# Patient Record
Sex: FEMALE | Race: WHITE
[De-identification: names, ages, dates, MRNs, and addresses within clinical notes are randomized per-mention and may not be internally consistent; named-entity substitution may affect disease eponyms.]

---

## 2018-03-09 ENCOUNTER — HOSPITAL ENCOUNTER (OUTPATIENT)
Dept: HOSPITAL 62 - RAD | Age: 83
End: 2018-03-09
Attending: NURSE PRACTITIONER
Payer: MEDICARE
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
## 2018-03-09 DIAGNOSIS — S32.019A: Primary | ICD-10-CM

## 2018-03-09 PROCEDURE — 72131 CT LUMBAR SPINE W/O DYE: CPT

## 2018-03-09 NOTE — RADIOLOGY REPORT (SQ)
EXAM DESCRIPTION:  CT LUMBAR SPINE WITHOUT



COMPLETED DATE/TIME:  3/9/2018 9:52 am



REASON FOR STUDY:  PAIN (R52) R52  PAIN, UNSPECIFIED



COMPARISON:  None.



TECHNIQUE:  Axial images acquired through the lumbar spine without intravenous contrast.  Images revi
ewed with lung, soft tissue and bone windows.  Reconstructed coronal and sagittal MPR images reviewed
.  All images stored on PACS.

All CT scanners at this facility use dose modulation, iterative reconstruction, and/or weight based d
osing when appropriate to reduce radiation dose to as low as reasonably achievable (ALARA).

[facility]: Dose Right  [facility]: CareDose    [facility]: Dose Right    [facility]: Teradose 4D    [facility]: Smart Technologies



RADIATION DOSE:  CT Rad equipment meets quality standard of care and radiation dose reduction techniq
ues were employed. CTDIvol: 16.7 mGy. DLP: 502 mGy-cm. mGy.



LIMITATIONS:  None.



FINDINGS:  SEGMENTATION: Normal.  No transitional anatomy.

ALIGNMENT: Normal.

VERTEBRAL BODIES: There is a compression fracture involving the superior endplate of L1 which appears
 to be acute.  There is approximately 50% loss of height.  There is posterior displacement of the sup
erior corner of the backwall, approximately 3.4 mm.  AP diameter of the spinal canal at this level is
 15 cm.  The remainder of the lumbar vertebrae are intact.

DISCS: No significant protrusions.  Study limited by lack of intrathecal contrast.

PEDICLES, TRANSVERSE PROCESSES: Facet arthropathy throughout.  No fractures.  No dislocation.  No acu
te findings.

FACETS, POSTERIOR ELEMENTS: No fractures.  No dislocation.  No spinal stenosis.

HARDWARE: None in the spine.

VISUALIZED RIBS: No fractures.

SOFT TISSUES: No significant or acute finding in adjacent soft tissues.

OTHER: No other significant finding.



IMPRESSION:

1. RELATIVELY ACUTE COMPRESSION FRACTURE INVOLVING THE SUPERIOR ENDPLATE OF THE L1 VERTEBRAL BODY WIT
H APPROXIMATELY 50% LOSS OF HEIGHT.  MILD POSTERIOR DISPLACEMENT OF THE SUPERIOR CORNER OF THE BACKWA
LL.  THE REMAINDER OF THE LUMBAR VERTEBRAE ARE INTACT.

2. CHRONIC DEGENERATIVE CHANGES.  PROMINENT FACET ARTHROPATHY.



TECHNICAL DOCUMENTATION:  JOB ID:  [id number]

Quality ID # 436: Final reports with documentation of one or more dose reduction techniques (e.g., Au
tomated exposure control, adjustment of the mA and/or kV according to patient size, use of iterative 
reconstruction technique)

 2011 [employer]- All Rights Reserved



Reading location - IP/workstation name: [email or web address]

## 2018-03-10 ENCOUNTER — HOSPITAL ENCOUNTER (EMERGENCY)
Dept: HOSPITAL 62 - ER | Age: 83
Discharge: LEFT BEFORE DISCHARGE | End: 2018-03-10
Payer: MEDICARE

## 2018-03-10 DIAGNOSIS — Z53.21: Primary | ICD-10-CM

## 2018-03-21 ENCOUNTER — HOSPITAL ENCOUNTER (OUTPATIENT)
Dept: HOSPITAL 62 - RAD | Age: 83
End: 2018-03-21
Attending: ORTHOPAEDIC SURGERY
Payer: MEDICARE

## 2018-03-21 DIAGNOSIS — M54.5: Primary | ICD-10-CM

## 2018-03-21 PROCEDURE — 72148 MRI LUMBAR SPINE W/O DYE: CPT

## 2018-03-21 NOTE — RADIOLOGY REPORT (SQ)
EXAM DESCRIPTION:  MRI LUMBAR SPINE WITHOUT



COMPLETED DATE/TIME:  3/21/2018 10:52 am



REASON FOR STUDY:  LUMBAGO M54.5  LOW BACK PAIN



COMPARISON:  None.



TECHNIQUE:  Sagittal and Axial imaging includes T1, T2, STIR and gradient echo sequences. Coronal T2/
HASTE imaging.



LIMITATIONS:  Patient motion.



FINDINGS:  VISUALIZED UPPER ABDOMEN: Large right renal cyst.

SEGMENTATION: No transitional anatomy. The lowest well-developed disc space is labeled L5-S1.

ALIGNMENT: Anatomic.

VERTEBRAE: Compression fracture L1 approximately 75% height loss.  Mild retropulsion.

BONE MARROW: Heterogeneous increased T2 and decreased T1 signal in L1 vertebral body.

DISC SIGNAL: Desiccation several levels.

POSTERIOR ELEMENTS:  Intact.

HARDWARE: None in the spine.

CORD AND CONUS: Normal in size and signal intensity. Conus at the appropriate level.

SOFT TISSUES: No aortic aneurysm seen. No bulky retroperitoneal adenopathy or mass. No paraspinal mas
s or fluid.

L1-L2: Mild narrowing of the spinal canal and lateral recesses due to disc bulge and facet arthropath
y.

L2-L3: Mild narrowing of the spinal canal and lateral recesses due to disc bulge and facet arthropath
y.

L3-L4: Mild narrowing of the spinal canal and lateral recesses due to disc bulge and facet arthropath
y.

L4-L5: Mild narrowing spinal canal due to disc bulge and facet arthropathy.

L5-S1: Tiny central annular fissure.  Facet arthropathy.  No significant stenosis.

LOWER THORACIC: Mild spinal stenosis T12-L1.

SACRUM: Visualized upper sacrum intact.

OTHER: No other significant findings.



IMPRESSION:  Recent compression fracture L1.



TECHNICAL DOCUMENTATION:  JOB ID:  [id number]

 2011 [employer]- All Rights Reserved



Reading location - IP/workstation name: [email or web address]

## 2018-06-01 ENCOUNTER — HOSPITAL ENCOUNTER (EMERGENCY)
Dept: HOSPITAL 62 - ER | Age: 83
Discharge: HOME | End: 2018-06-01
Payer: MEDICARE

## 2018-06-01 VITALS — DIASTOLIC BLOOD PRESSURE: 61 MMHG | SYSTOLIC BLOOD PRESSURE: 124 MMHG

## 2018-06-01 DIAGNOSIS — G30.9: ICD-10-CM

## 2018-06-01 DIAGNOSIS — F02.80: ICD-10-CM

## 2018-06-01 DIAGNOSIS — N30.00: Primary | ICD-10-CM

## 2018-06-01 DIAGNOSIS — I10: ICD-10-CM

## 2018-06-01 DIAGNOSIS — R30.9: ICD-10-CM

## 2018-06-01 LAB
APPEARANCE UR: (no result)
APTT PPP: YELLOW S
BILIRUB UR QL STRIP: NEGATIVE
GLUCOSE UR STRIP-MCNC: NEGATIVE MG/DL
KETONES UR STRIP-MCNC: NEGATIVE MG/DL
NITRITE UR QL STRIP: POSITIVE
PH UR STRIP: 5 [PH] (ref 5–9)
PROT UR STRIP-MCNC: NEGATIVE MG/DL
SP GR UR STRIP: 1.02
UROBILINOGEN UR-MCNC: NEGATIVE MG/DL (ref ?–2)

## 2018-06-01 PROCEDURE — 87086 URINE CULTURE/COLONY COUNT: CPT

## 2018-06-01 PROCEDURE — 81001 URINALYSIS AUTO W/SCOPE: CPT

## 2018-06-01 PROCEDURE — 87186 SC STD MICRODIL/AGAR DIL: CPT

## 2018-06-01 PROCEDURE — 87088 URINE BACTERIA CULTURE: CPT

## 2018-06-01 PROCEDURE — 99283 EMERGENCY DEPT VISIT LOW MDM: CPT

## 2018-06-01 NOTE — ER DOCUMENT REPORT
ED General





- General


Chief Complaint: Medical Clearance


Stated Complaint: POSSIBLE URINATION ISSUES


Time Seen by Provider: 06/01/18 17:34


Mode of Arrival: Ambulatory


Information source: Patient, Relative


Notes: 





82-year-old female presents with complaints of agitation possible UTI from care 

facility.  Patient has a history of dementia Alzheimer's, daughter notes she 

received a call from the facility that the patient was agitated and combative, 

when she arrived she noted that her mother was at her baseline, a concern for 

combativeness was that she picked up a mop from a closet but she did not swing 

it at anyone and did not hit anyone, care facility demand that patient be 

evaluated in the emergency department, patient's daughter states that this is 

her baseline


Patient herself is eating a bag of chips and denies any complaints


TRAVEL OUTSIDE OF THE [place] IN LAST 30 DAYS: No





- HPI


Onset: Just prior to arrival


Onset/Duration: Sudden


Quality of pain: No pain


Severity: Mild


Pain Level: Denies


Associated symptoms: Other


Exacerbated by: Denies


Relieved by: Denies


Similar symptoms previously: No


Recently seen / treated by doctor: No





- Related Data


Allergies/Adverse Reactions: 


 





No Known Allergies Allergy (Verified 06/01/18 17:14)


 











Past Medical History





- Social History


Smoking Status: Never Smoker


Cigarette use (# per day): No


Chew tobacco use (# tins/day): No


Smoking Education Provided: No


Family History: Reviewed & Not Pertinent





- Past Medical History


Cardiac Medical History: Reports: Hx Hypercholesterolemia, Hx Hypertension


   Denies: Hx Atrial Fibrillation, Hx Heart Attack


Pulmonary Medical History: 


   Denies: Hx Asthma


Neurological Medical History: Denies: Hx Cerebrovascular Accident, Hx Seizures


GI Medical History: Reports: Hx Gastroesophageal Reflux Disease.  Denies: Hx 

Hepatitis, Hx Hiatal Hernia, Hx Ulcer


Infectious Medical History: Denies: Hx Hepatitis


Past Surgical History: Reports: Hx Bowel Surgery - gastric bypass, Hx Gastric 

Bypass Surgery, Hx Hysterectomy.  Denies: Hx Mastectomy, Hx Open Heart Surgery, 

Hx Pacemaker





- Immunizations


Hx Diphtheria, Pertussis, Tetanus Vaccination: No





Review of Systems





- Review of Systems


Notes: 





REVIEW OF SYSTEMS:


CONSTITUTIONAL :  Denies fever,  chills, or sweats.  Denies recent illness.


EENT:   Denies eye, ear, throat, or mouth pain or symptoms.  Denies nasal or 

sinus congestion or discharge.  Denies throat, tongue, or mouth swelling or 

difficulty swallowing.


CARDIOVASCULAR:  Denies chest pain.  Denies palpitations or racing or irregular 

heart beat.  Denies ankle edema.


RESPIRATORY:  Denies cough, cold, or chest congestion.  Denies shortness of 

breath, difficulty breathing, or wheezing.


GASTROINTESTINAL:  Denies abdominal pain or distention.  Denies nausea, vomiting

, or diarrhea.  Denies blood in vomitus, stools, or per rectum.  Denies black, 

tarry stools.  Denies constipation. 


GENITOURINARY:  Denies difficulty urinating, painful urination, burning, 

frequency, blood in urine, or discharge.


FEMALE  GENITOURINARY:  Denies vaginal bleeding, heavy or abnormal periods, 

irregular periods.  Denies vaginal discharge or odor. 


MUSCULOSKELETAL:  Denies back or neck pain or stiffness.  Denies joint pain or 

swelling.


SKIN:   Denies rash, lesions or sores.


HEMATOLOGIC :   Denies easy bruising or bleeding.


LYMPHATIC:  Denies swollen, enlarged glands.


NEUROLOGICAL: Agitation


PSYCHIATRIC:  Denies anxiety or stress.  Denies depression, suicidal ideation, 

or homicidal ideation.





ALL OTHER SYSTEMS REVIEWED AND NEGATIVE.











PHYSICAL EXAMINATION:





GENERAL: Well-appearing, well-nourished and in no acute distress.





HEAD: Atraumatic, normocephalic.





EYES: Pupils equal round and reactive to light, extraocular movements intact, 

conjunctiva are normal.





ENT: Nares patent, oropharynx clear without exudates.  Moist mucous membranes.





NECK: Normal range of motion, supple without lymphadenopathy





LUNGS: Breath sounds clear to auscultation bilaterally and equal.  No wheezes 

rales or rhonchi.





HEART: Regular rate and rhythm without murmurs





ABDOMEN: Soft, nontender, nondistended abdomen.  No guarding, no rebound.  No 

masses appreciated.





Female : deferred





Musculoskeletal: Normal range of motion, no pitting or edema.  No cyanosis.





NEUROLOGICAL: Cranial nerves grossly intact.  Normal speech, normal gait.  

Normal sensory, motor exams





PSYCH: Normal mood, normal affect.





SKIN: Warm, Dry, normal turgor, no rashes or lesions noted.

















Dictation was performed using Dragon voice recognition software





Physical Exam





- Vital signs


Vitals: 


 











Temp Pulse Resp BP Pulse Ox


 


 98.2 F   92   20   102/61   96 


 


 06/01/18 17:20  06/01/18 17:20  06/01/18 17:20  06/01/18 17:20  06/01/18 17:20














Course





- Re-evaluation


Re-evalutation: 





06/01/18 17:50


My examination patient looks completely benign she is resting comfortably 

eating chips, I will have her give us a urine sample however I believe this 

agitation is just a normal transition of her alzheimers


06/01/18 17:50





06/01/18 19:08


Patient does have a UTI, she overall looks well, I will treated for her urinary 

tract infection she is otherwise


Patient has no signs of altered mental status





After performing a Medical Screening Examination, I estimate there is LOW risk 

for MENINGITIS or ISCHEMIC STROKE thus I consider the discharge disposition 

reasonable.  I have reevaluated this patient multiple times and no significant 

life threatening changes are noted. The patients daughter and I have discussed 

the diagnosis and risks, and we agree with discharging home with close follow-

up with the understanding that symptoms and presentations can change. We also 

discussed returning to the Emergency Department immediately if new or worsening 

symptoms occur. We have discussed the symptoms which are most concerning (e.g., 

changing or worsening symptoms, new numbness or weakness, vomiting, fever) that 

necessitate immediate return.





- Vital Signs


Vital signs: 


 











Temp Pulse Resp BP Pulse Ox


 


 98.2 F   92   20   102/61   96 


 


 06/01/18 17:20  06/01/18 17:20  06/01/18 17:20  06/01/18 17:20  06/01/18 17:20














- Laboratory


Laboratory results interpreted by me: 


 











  06/01/18





  17:49


 


Urine Nitrite  POSITIVE H


 


Ur Leukocyte Esterase  MODERATE H














Discharge





- Discharge


Clinical Impression: 


UTI (urinary tract infection)


Qualifiers:


 Urinary tract infection type: acute cystitis Hematuria presence: without 

hematuria Qualified Code(s): N30.00 - Acute cystitis without hematuria





Alzheimer disease


Qualifiers:


 Alzheimer's disease onset: early-onset Dementia behavioral disturbance: with 

behavioral disturbance Qualified Code(s): G30.0 - Alzheimer's disease with 

early onset; F02.81 - Dementia in other diseases classified elsewhere with 

behavioral disturbance; F02.81 - Dementia in other diseases classified 

elsewhere with behavioral disturbance; F02.81 - Dementia in other diseases 

classified elsewhere with behavioral disturbance





Condition: Stable


Disposition: HOME, SELF-CARE


Instructions:  Urinary Tract Infection ([facility])


Prescriptions: 


Cephalexin Monohydrate [Keflex 500 mg Capsule] 500 mg PO BID 7 Days  capsule


Referrals: 


LEXI LAL, NP [Primary Care Provider] - Follow up tomorrow

## 2018-07-15 ENCOUNTER — HOSPITAL ENCOUNTER (EMERGENCY)
Dept: HOSPITAL 62 - ER | Age: 83
Discharge: HOME | End: 2018-07-15
Payer: MEDICARE

## 2018-07-15 VITALS — DIASTOLIC BLOOD PRESSURE: 57 MMHG | SYSTOLIC BLOOD PRESSURE: 126 MMHG

## 2018-07-15 DIAGNOSIS — R51: ICD-10-CM

## 2018-07-15 DIAGNOSIS — F03.90: ICD-10-CM

## 2018-07-15 DIAGNOSIS — S01.91XA: Primary | ICD-10-CM
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
## 2018-07-15 DIAGNOSIS — N39.0: ICD-10-CM

## 2018-07-15 DIAGNOSIS — M25.552: ICD-10-CM

## 2018-07-15 DIAGNOSIS — I10: ICD-10-CM

## 2018-07-15 LAB
ADD MANUAL DIFF: NO
ALBUMIN SERPL-MCNC: 2.9 G/DL (ref 3.5–5)
ALP SERPL-CCNC: 90 U/L (ref 38–126)
ALT SERPL-CCNC: 24 U/L (ref 9–52)
ANION GAP SERPL CALC-SCNC: 7 MMOL/L (ref 5–19)
APPEARANCE UR: (no result)
APTT PPP: YELLOW S
AST SERPL-CCNC: 24 U/L (ref 14–36)
BASOPHILS # BLD AUTO: 0 10^3/UL (ref 0–0.2)
BASOPHILS NFR BLD AUTO: 0.3 % (ref 0–2)
BILIRUB DIRECT SERPL-MCNC: 0.4 MG/DL (ref 0–0.4)
BILIRUB SERPL-MCNC: 0.7 MG/DL (ref 0.2–1.3)
BILIRUB UR QL STRIP: NEGATIVE
BUN SERPL-MCNC: 21 MG/DL (ref 7–20)
CALCIUM: 8.3 MG/DL (ref 8.4–10.2)
CHLORIDE SERPL-SCNC: 100 MMOL/L (ref 98–107)
CO2 SERPL-SCNC: 27 MMOL/L (ref 22–30)
EOSINOPHIL # BLD AUTO: 0 10^3/UL (ref 0–0.6)
EOSINOPHIL NFR BLD AUTO: 0.1 % (ref 0–6)
ERYTHROCYTE [DISTWIDTH] IN BLOOD BY AUTOMATED COUNT: 16 % (ref 11.5–14)
GLUCOSE SERPL-MCNC: 102 MG/DL (ref 75–110)
GLUCOSE UR STRIP-MCNC: 50 MG/DL
HCT VFR BLD CALC: 36.5 % (ref 36–47)
HGB BLD-MCNC: 12.3 G/DL (ref 12–15.5)
KETONES UR STRIP-MCNC: NEGATIVE MG/DL
LYMPHOCYTES # BLD AUTO: 0.9 10^3/UL (ref 0.5–4.7)
LYMPHOCYTES NFR BLD AUTO: 8.8 % (ref 13–45)
MCH RBC QN AUTO: 29.6 PG (ref 27–33.4)
MCHC RBC AUTO-ENTMCNC: 33.7 G/DL (ref 32–36)
MCV RBC AUTO: 88 FL (ref 80–97)
MONOCYTES # BLD AUTO: 0.6 10^3/UL (ref 0.1–1.4)
MONOCYTES NFR BLD AUTO: 5.6 % (ref 3–13)
NEUTROPHILS # BLD AUTO: 8.6 10^3/UL (ref 1.7–8.2)
NEUTS SEG NFR BLD AUTO: 85.2 % (ref 42–78)
NITRITE UR QL STRIP: POSITIVE
PH UR STRIP: 5 [PH] (ref 5–9)
PLATELET # BLD: 273 10^3/UL (ref 150–450)
POTASSIUM SERPL-SCNC: 3.3 MMOL/L (ref 3.6–5)
PROT SERPL-MCNC: 5.5 G/DL (ref 6.3–8.2)
PROT UR STRIP-MCNC: NEGATIVE MG/DL
RBC # BLD AUTO: 4.16 10^6/UL (ref 3.72–5.28)
SODIUM SERPL-SCNC: 134 MMOL/L (ref 137–145)
SP GR UR STRIP: 1.02
TOTAL CELLS COUNTED % (AUTO): 100 %
UROBILINOGEN UR-MCNC: 2 MG/DL (ref ?–2)
WBC # BLD AUTO: 10 10^3/UL (ref 4–10.5)

## 2018-07-15 PROCEDURE — 73502 X-RAY EXAM HIP UNI 2-3 VIEWS: CPT

## 2018-07-15 PROCEDURE — 80053 COMPREHEN METABOLIC PANEL: CPT

## 2018-07-15 PROCEDURE — 85025 COMPLETE CBC W/AUTO DIFF WBC: CPT

## 2018-07-15 PROCEDURE — 99284 EMERGENCY DEPT VISIT MOD MDM: CPT

## 2018-07-15 PROCEDURE — 72125 CT NECK SPINE W/O DYE: CPT

## 2018-07-15 PROCEDURE — 81001 URINALYSIS AUTO W/SCOPE: CPT

## 2018-07-15 PROCEDURE — 71045 X-RAY EXAM CHEST 1 VIEW: CPT

## 2018-07-15 PROCEDURE — 36415 COLL VENOUS BLD VENIPUNCTURE: CPT

## 2018-07-15 PROCEDURE — 93005 ELECTROCARDIOGRAM TRACING: CPT

## 2018-07-15 PROCEDURE — 70450 CT HEAD/BRAIN W/O DYE: CPT

## 2018-07-15 PROCEDURE — 0HQ0XZZ REPAIR SCALP SKIN, EXTERNAL APPROACH: ICD-10-PCS | Performed by: EMERGENCY MEDICINE

## 2018-07-15 PROCEDURE — 93010 ELECTROCARDIOGRAM REPORT: CPT

## 2018-07-15 PROCEDURE — 12001 RPR S/N/AX/GEN/TRNK 2.5CM/<: CPT

## 2018-07-15 PROCEDURE — 84484 ASSAY OF TROPONIN QUANT: CPT

## 2018-07-15 NOTE — RADIOLOGY REPORT (SQ)
EXAM DESCRIPTION:  CT HEAD WITHOUT



COMPLETED DATE/TIME:  7/15/2018 3:29 pm



REASON FOR STUDY:  trauma



COMPARISON:  7/16/2013



TECHNIQUE:  Axial images acquired through the brain without intravenous contrast.  Images reviewed wi
th bone, brain and subdural windows.   Images stored on PACS.

All CT scanners at this facility use dose modulation, iterative reconstruction, and/or weight based d
osing when appropriate to reduce radiation dose to as low as reasonably achievable (ALARA).

[facility]: Dose Right  [facility]: CareDose    [facility]: Dose Right    [facility]: Teradose 4D    [facility]: Smart Technologies



RADIATION DOSE:  CT Rad equipment meets quality standard of care and radiation dose reduction techniq
ues were employed. CTDIvol: 53.2 - 55.2 mGy. DLP: 2074 mGy-cm.mGy.



LIMITATIONS:  None.



FINDINGS:  VENTRICLES: Prominent.

CEREBRUM: No masses.  No hemorrhage.  No midline shift.  Areas of low density in the white matter mos
t likely due to chronic micro-vascular ischemic change.  No evidence for acute infarction.

CEREBELLUM: No masses.  No hemorrhage.  No alteration of density.  No evidence for acute infarction.

EXTRAAXIAL SPACES: Age-related involutional change.  No fluid collections.  No masses.

ORBITS AND GLOBE: No intra- or extraconal masses.  Normal contour of globe without masses.

CALVARIUM: No fracture.

PARANASAL SINUSES: No fluid or mucosal thickening.

SOFT TISSUES: No mass or hematoma.

OTHER: No other significant finding.



IMPRESSION:  No acute intracranial findings.

EVIDENCE OF ACUTE STROKE: NO.



TECHNICAL DOCUMENTATION:  JOB ID:  [id number]

[id number]

Quality ID # 436: Final reports with documentation of one or more dose reduction techniques (e.g., Au
tomated exposure control, adjustment of the mA and/or kV according to patient size, use of iterative 
reconstruction technique)

 2011 [employer]- All Rights Reserved



Reading location - IP/workstation name: [email or web address]

## 2018-07-15 NOTE — RADIOLOGY REPORT (SQ)
EXAM DESCRIPTION:  HIP LEFT AP/LATERAL



COMPLETED DATE/TIME:  7/15/2018 3:50 pm



REASON FOR STUDY:  L hip pain



COMPARISON:  None.



NUMBER OF VIEWS:  Two views.



TECHNIQUE:  AP pelvis and additional frog-leg view of the left hip.



LIMITATIONS:  None.



FINDINGS:  MINERALIZATION: Normal.

LEFT HIP: No fracture or dislocation.  No worrisome bone lesions. No contour deformity.  No significa
nt joint space narrowing.

RIGHT HIP: No fracture or dislocation.  No worrisome bone lesions.

PUBIS AND ISCHIUM: No fracture.  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

PELVIS: No fracture.

SACRUM: There appears to be cortical irregularity/disruption of the right sacrum at approximately the
 S2 level

LOWER LUMBAR SPINE: Multilevel spondylotic change.  No acute findings.

SOFT TISSUES: No findings.

OTHER: No other significant finding.



IMPRESSION:  1.  Mild degenerative changes.  No significant findings to correlate to the patient's re
ported left hip pain.

2.  There appears to be cortical disruption of the superior margin of the right S2 sacral foramina.  
Recommend correlation for mechanism of injury and/or associated symptomatology.



TECHNICAL DOCUMENTATION:  JOB ID:  [id number]

 2011 [employer]- All Rights Reserved



Reading location - IP/workstation name: [email or web address]

## 2018-07-15 NOTE — RADIOLOGY REPORT (SQ)
EXAM DESCRIPTION:  CHEST SINGLE VIEW



COMPLETED DATE/TIME:  7/15/2018 3:50 pm



REASON FOR STUDY:  fall



COMPARISON:  None.



EXAM PARAMETERS:  NUMBER OF VIEWS: One view.

TECHNIQUE: Single frontal radiographic view of the chest acquired.

RADIATION DOSE: NA

LIMITATIONS: None.



FINDINGS:  LUNGS AND PLEURA: No consolidation, masses or pneumothorax. No pleural effusion.

MEDIASTINUM AND HILAR STRUCTURES: No masses.  Contour normal.

HEART AND VASCULAR STRUCTURES: Heart normal in size.  Normal vasculature.

BONES: No acute findings.

HARDWARE: None in the chest.

OTHER: No other significant finding.



IMPRESSION:  NO ACUTE RADIOGRAPHIC FINDING IN THE CHEST.



TECHNICAL DOCUMENTATION:  JOB ID:  [id number]

[id number]

 2011 [employer]- All Rights Reserved



Reading location - IP/workstation name: [email or web address]

## 2018-07-15 NOTE — RADIOLOGY REPORT (SQ)
EXAM DESCRIPTION:  CT CERVICAL SPINE WITHOUT



COMPLETED DATE/TIME:  7/15/2018 3:29 pm



REASON FOR STUDY:  trauma



COMPARISON:  None.



TECHNIQUE:  Axial images acquired through the cervical spine without intravenous contrast.  Images re
viewed with lung, soft tissue and bone windows.  Reconstructed coronal and sagittal MPR images review
ed.  Images stored on PACS.

All CT scanners at this facility use dose modulation, iterative reconstruction, and/or weight based d
osing when appropriate to reduce radiation dose to as low as reasonably achievable (ALARA).

[facility]: Dose Right  [facility]: CareDose    [facility]: Dose Right    [facility]: Teradose 4D    [facility]: [employer]



RADIATION DOSE:  CT Rad equipment meets quality standard of care and radiation dose reduction techniq
ues were employed. CTDIvol: 17.6 mGy. DLP: 327 mGy-cm. mGy.



LIMITATIONS:  None.



FINDINGS:  ALIGNMENT: Anatomic.

MINERALIZATION: Normal.

VERTEBRAL BODIES: No fractures or dislocation.

DISCS: Multilevel disc space narrowing with osteophytes.

FACETS, LATERAL MASSES, POSTERIOR ELEMENTS: Facet arthropathy.  No fractures.  No dislocation.  No ac
Jackson findings.

HARDWARE: None in the spine.

VISUALIZED RIBS: No fractures.

LUNG APICES AND SOFT TISSUES: No significant or acute findings.

OTHER: No other significant finding.



IMPRESSION:  CHRONIC DEGENERATIVE CHANGES.  NO ACUTE FINDINGS.



TECHNICAL DOCUMENTATION:  JOB ID:  [id number]

[id number]

Quality ID # 436: Final reports with documentation of one or more dose reduction techniques (e.g., Au
tomated exposure control, adjustment of the mA and/or kV according to patient size, use of iterative 
reconstruction technique)

 2011 [employer]- All Rights Reserved



Reading location - IP/workstation name: [email or web address]

## 2018-07-15 NOTE — ER DOCUMENT REPORT
ED Fall





- General


Chief Complaint: Fall


Stated Complaint: FALL/HEAD PAIN


Time Seen by Provider: 07/15/18 14:48


Mode of Arrival: Medic


Information source: Patient, Relative


Notes: 





82-year-old female brought to the emergency department by EMS status post fall.

  Patient was found laying in the hallway.  There is bleeding to the left 

occiput.  Patient was awake, alert, oriented 3.  She had no complaints at the 

time.  Patient does have a history of dementia.  Family is at bedside.  They 

state that the patient is acting like her normal self.  Patient is complaining 

of some left hip pain.  She is able to flex and extend her left lower extremity 

despite the pain.  Patient denies any vision changes, speech changes, numbness, 

tingling, weakness, chest pain, shortness of breath.


TRAVEL OUTSIDE OF THE [place] IN LAST 30 DAYS: No





- HPI


Occurred: Just prior to arrival


Where: Nursing Home


Context: Fell from standing


Associated symptoms: None


Location of injury/pain: Hip


Quality of pain: Achy


Severity: Mild





- Related data


Allergies/Adverse Reactions: 


 





No Known Allergies Allergy (Verified 06/01/18 17:14)


 











Past Medical History





- General


Information source: Patient





- Social History


Smoking Status: Never Smoker


Family History: Reviewed & Not Pertinent





- Past Medical History


Cardiac Medical History: Reports: Hx Hypercholesterolemia, Hx Hypertension


   Denies: Hx Atrial Fibrillation, Hx Heart Attack


Pulmonary Medical History: 


   Denies: Hx Asthma


Neurological Medical History: Denies: Hx Cerebrovascular Accident, Hx Seizures


Renal/ Medical History: Denies: Hx Peritoneal Dialysis


GI Medical History: Reports: Hx Gastroesophageal Reflux Disease.  Denies: Hx 

Hepatitis, Hx Hiatal Hernia, Hx Ulcer


Infectious Medical History: Denies: Hx Hepatitis


Past Surgical History: Reports: Hx Bowel Surgery - gastric bypass, Hx Gastric 

Bypass Surgery, Hx Hysterectomy.  Denies: Hx Mastectomy, Hx Open Heart Surgery, 

Hx Pacemaker





- Immunizations


Hx Diphtheria, Pertussis, Tetanus Vaccination: No





Review of Systems





- Review of Systems


Constitutional: No symptoms reported


EENT: No symptoms reported


Cardiovascular: No symptoms reported


Respiratory: No symptoms reported


Gastrointestinal: No symptoms reported


Genitourinary: No symptoms reported


Female Genitourinary: No symptoms reported


Musculoskeletal: Joint pain


Skin: Lesions


Hematologic/Lymphatic: No symptoms reported


Neurological/Psychological: No symptoms reported


-: Yes All other systems reviewed and negative





Physical Exam





- Vital signs


Vitals: 


 











Pulse BP


 


 67   126/57 H


 


 07/15/18 18:30  07/15/18 18:30














- Notes


Notes: 





PHYSICAL EXAMINATION:





GENERAL: Well-appearing, well-nourished and in no acute distress.





HEAD: 2cm laceration to the posterior occiput, normocephalic.





EYES: Pupils equal round and reactive to light, extraocular movements intact, 

conjunctiva are normal.





ENT: Nares patent, oropharynx clear without exudates.  Moist mucous membranes.





NECK: Normal range of motion, supple without lymphadenopathy





LUNGS: Breath sounds clear to auscultation bilaterally and equal.  No wheezes 

rales or rhonchi.





HEART: Regular rate and rhythm without murmurs





ABDOMEN: Soft, nontender, nondistended abdomen.  No guarding, no rebound.  No 

masses appreciated.





Female : deferred





Musculoskeletal: Normal range of motion, no pitting or edema.  No cyanosis. No 

tenderness to palpation of the L hip. 





NEUROLOGICAL: Cranial nerves grossly intact.  Normal speech, normal gait.  

Normal sensory, motor exams





PSYCH: Normal mood, normal affect.





SKIN: Warm, Dry, normal turgor, no rashes or lesions noted.





Course





- Re-evaluation


Re-evalutation: 





07/15/18 18:32


Labs and imaging obtained.  CT of the head does not show any acute process.  X-

ray did not show an acute process.  Labs show some hypo-kalemia.  Patient was 

given potassium chloride in the emergency department.  Urinalysis positive for 

leukocyte esterase and nitrates.  Patient scalp was stapled.  2 staples were 

placed.  I will start the patient on Keflex to cover her urinary tract 

infection.  Patient instructed to follow-up with her primary care physician 

this week, to take medications prescribed as directed, and to return to 

emergency department for worsening symptoms.














- Vital Signs


Vital signs: 


 











Temp Pulse Resp BP Pulse Ox


 


    67      126/57 H   


 


    07/15/18 18:30     07/15/18 18:30   














- Laboratory


Result Diagrams: 


 07/15/18 16:43





 07/15/18 16:43


Laboratory results interpreted by me: 


 











  07/15/18 07/15/18 07/15/18





  15:15 16:43 16:43


 


RDW   16.0 H 


 


Seg Neutrophils %   85.2 H 


 


Lymphocytes %   8.8 L 


 


Absolute Neutrophils   8.6 H 


 


Sodium    134.0 L


 


Potassium    3.3 L


 


BUN    21 H


 


Est GFR (Non-Af Amer)    52 L


 


Calcium    8.3 L


 


Total Protein    5.5 L


 


Albumin    2.9 L


 


Urine Glucose (UA)  50 H  


 


Urine Nitrite  POSITIVE H  


 


Urine Urobilinogen  2.0 H  


 


Ur Leukocyte Esterase  TRACE H  














- EKG Interpretation by Me


Additional EKG results interpreted by me: 





07/15/18 15:54


EKG: Ventricular rate 60, AK interval 172, QRS duration 150, QTc 480, sinus 

rhythm, right bundle branch block, EKG similar to that done on 7/16/13.





Procedures





- Laceration/Wound Repair


  ** Head


Wound length (cm): 2


Wound's Depth, Shape: Superficial, Linear


Wound explored: Clean, No foreign body removed


Irrigated w/ Saline (mLs): 250


Wound Repaired With: Staples


Number of Sutures: 2


Post-procedure NV exam normal: Yes


Complications: No





Discharge





- Discharge


Clinical Impression: 


 Laceration, Abrasion





Urinary tract infection


Qualifiers:


 Urinary tract infection type: site unspecified Hematuria presence: without 

hematuria Qualified Code(s): N39.0 - Urinary tract infection, site not specified





Condition: Stable


Disposition: HOME, SELF-CARE


Instructions:  Antibiotic Ointment Protection ([facility]), Laceration Care ([facility]), 

Urinary Tract Infection ([facility])


Prescriptions: 


Cephalexin Monohydrate [Keflex 500 mg Capsule] 500 mg PO Q6H 5 Days #20 capsule


Referrals: 


LEXI LAL NP [Primary Care Provider] - Follow up as needed

## 2018-07-16 NOTE — EKG REPORT
SEVERITY:- ABNORMAL ECG -

SINUS RHYTHM

RIGHT BUNDLE BRANCH BLOCK

:

Confirmed by: Berkley Jauregui MD 16-Jul-2018 00:53:18

## 2018-08-08 ENCOUNTER — HOSPITAL ENCOUNTER (EMERGENCY)
Dept: HOSPITAL 62 - ER | Age: 83
Discharge: HOME | End: 2018-08-08
Payer: MEDICARE

## 2018-08-08 VITALS — DIASTOLIC BLOOD PRESSURE: 90 MMHG | SYSTOLIC BLOOD PRESSURE: 181 MMHG
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
## 2018-08-08 DIAGNOSIS — F03.90: ICD-10-CM

## 2018-08-08 DIAGNOSIS — I10: ICD-10-CM

## 2018-08-08 DIAGNOSIS — R07.81: ICD-10-CM

## 2018-08-08 DIAGNOSIS — S01.81XA: ICD-10-CM

## 2018-08-08 DIAGNOSIS — S22.41XA: Primary | ICD-10-CM

## 2018-08-08 DIAGNOSIS — Z98.84: ICD-10-CM

## 2018-08-08 LAB
ADD MANUAL DIFF: NO
ALBUMIN SERPL-MCNC: 2.9 G/DL (ref 3.5–5)
ALP SERPL-CCNC: 94 U/L (ref 38–126)
ALT SERPL-CCNC: 67 U/L (ref 9–52)
ANION GAP SERPL CALC-SCNC: 8 MMOL/L (ref 5–19)
AST SERPL-CCNC: 188 U/L (ref 14–36)
BASOPHILS # BLD AUTO: 0 10^3/UL (ref 0–0.2)
BASOPHILS NFR BLD AUTO: 0.3 % (ref 0–2)
BILIRUB DIRECT SERPL-MCNC: 0.3 MG/DL (ref 0–0.4)
BILIRUB SERPL-MCNC: 0.7 MG/DL (ref 0.2–1.3)
BUN SERPL-MCNC: 19 MG/DL (ref 7–20)
CALCIUM: 8.5 MG/DL (ref 8.4–10.2)
CHLORIDE SERPL-SCNC: 99 MMOL/L (ref 98–107)
CO2 SERPL-SCNC: 27 MMOL/L (ref 22–30)
EOSINOPHIL # BLD AUTO: 0 10^3/UL (ref 0–0.6)
EOSINOPHIL NFR BLD AUTO: 0.1 % (ref 0–6)
ERYTHROCYTE [DISTWIDTH] IN BLOOD BY AUTOMATED COUNT: 15 % (ref 11.5–14)
GLUCOSE SERPL-MCNC: 83 MG/DL (ref 75–110)
HCT VFR BLD CALC: 36.4 % (ref 36–47)
HGB BLD-MCNC: 12.1 G/DL (ref 12–15.5)
LIPASE SERPL-CCNC: 27.4 U/L (ref 23–300)
LYMPHOCYTES # BLD AUTO: 0.8 10^3/UL (ref 0.5–4.7)
LYMPHOCYTES NFR BLD AUTO: 6.3 % (ref 13–45)
MCH RBC QN AUTO: 29.6 PG (ref 27–33.4)
MCHC RBC AUTO-ENTMCNC: 33.3 G/DL (ref 32–36)
MCV RBC AUTO: 89 FL (ref 80–97)
MONOCYTES # BLD AUTO: 0.5 10^3/UL (ref 0.1–1.4)
MONOCYTES NFR BLD AUTO: 4.3 % (ref 3–13)
NEUTROPHILS # BLD AUTO: 11.3 10^3/UL (ref 1.7–8.2)
NEUTS SEG NFR BLD AUTO: 89 % (ref 42–78)
PLATELET # BLD: 317 10^3/UL (ref 150–450)
POTASSIUM SERPL-SCNC: 3.5 MMOL/L (ref 3.6–5)
PROT SERPL-MCNC: 5.6 G/DL (ref 6.3–8.2)
RBC # BLD AUTO: 4.1 10^6/UL (ref 3.72–5.28)
SODIUM SERPL-SCNC: 134 MMOL/L (ref 137–145)
TOTAL CELLS COUNTED % (AUTO): 100 %
WBC # BLD AUTO: 12.7 10^3/UL (ref 4–10.5)

## 2018-08-08 PROCEDURE — 83690 ASSAY OF LIPASE: CPT

## 2018-08-08 PROCEDURE — 80053 COMPREHEN METABOLIC PANEL: CPT

## 2018-08-08 PROCEDURE — 36415 COLL VENOUS BLD VENIPUNCTURE: CPT

## 2018-08-08 PROCEDURE — 85025 COMPLETE CBC W/AUTO DIFF WBC: CPT

## 2018-08-08 PROCEDURE — 72125 CT NECK SPINE W/O DYE: CPT

## 2018-08-08 PROCEDURE — 71101 X-RAY EXAM UNILAT RIBS/CHEST: CPT

## 2018-08-08 PROCEDURE — 99284 EMERGENCY DEPT VISIT MOD MDM: CPT

## 2018-08-08 PROCEDURE — 70450 CT HEAD/BRAIN W/O DYE: CPT
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
## 2018-08-08 NOTE — ER DOCUMENT REPORT
ED Fall





- General


Stated Complaint: FALL,HEAD LACERATION


Time Seen by Provider: 08/08/18 12:23


Notes: 





Patient is a resident at [facility] and fell this morning and hit her 

forehead sustaining a small laceration.  She does not think she was 

unconscious.  Patient acts as if she has dementia so I think her history is 

probably unreliable.  Her primary complaint is of pain in the lower right ribs 

anteriorly which hurt for her to touch her for me to touch.  Denies feeling 

short of breath.  Is moving all 4 extremities.  Limited history due to patient'

s dementia.  Denies neck pain.


TRAVEL OUTSIDE OF THE [place] IN LAST 30 DAYS: No





- Related data


Allergies/Adverse Reactions: 


 





No Known Allergies Allergy (Verified 06/01/18 17:14)


 











Past Medical History





- Social History


Smoking Status: Unknown if Ever Smoked


Family History: Reviewed & Not Pertinent





- Past Medical History


Cardiac Medical History: Reports: Hx Hypercholesterolemia, Hx Hypertension


   Denies: Hx Atrial Fibrillation, Hx Heart Attack


Pulmonary Medical History: 


   Denies: Hx Asthma


Neurological Medical History: Denies: Hx Cerebrovascular Accident, Hx Seizures


GI Medical History: Reports: Hx Gastroesophageal Reflux Disease.  Denies: Hx 

Hepatitis, Hx Hiatal Hernia, Hx Ulcer


Psychiatric Medical History: Reports: Hx Dementia


Infectious Medical History: Denies: Hx Hepatitis


Past Surgical History: Reports: Hx Bowel Surgery - gastric bypass, Hx Gastric 

Bypass Surgery, Hx Hysterectomy





- Immunizations


Hx Diphtheria, Pertussis, Tetanus Vaccination: No





Review of Systems





- Review of Systems


-: Yes ROS unobtainable due to patient's medical condition - Unable to provide 

reliable review of systems--dementia





Physical Exam





- Vital signs


Vitals: 


 











Resp BP Pulse Ox


 


 21 H  142/73 H  100 


 


 08/08/18 12:14  08/08/18 12:14  08/08/18 12:14











Interpretation: Normal





- Notes


Notes: 





PHYSICAL EXAMINATION:





GENERAL: Well-appearing, in no acute distress.  Anxious.  Interactive but 

somewhat confused.





HEAD: Patient has a small punctate laceration in the mid right forehead area.  

Not actively bleeding at this time.





EYES: Pupils equal round and reactive to light, extraocular movements intact.





ENT: oropharynx clear without exudates.  Moist mucous membranes.





NECK: Normal range of motion, supple.





LUNGS: Breath sounds clear and equal bilaterally.  Extremely tender for me to 

press even lightly on the lower right anterior ribs,  corresponding to the 

lower portion of the right breast.  No crepitus noted.





HEART: Regular rate and rhythm without murmurs.





ABDOMEN: Soft, nontender.  No guarding or rebound.  No masses.  No tenderness 

to the liver region.





BACK:  No tenderness throughout entire back.





EXTREMITIES: Normal range of motion without pain.  Full range of motion without 

any apparent significant injury to any of the 4 extremities.





NEUROLOGICAL:  Normal speech, though somewhat confused.    Normal sensory, motor

, and reflex exams.  Awake, alert, but not oriented x3. 





PSYCH: Normal mood, normal affect.





SKIN: Warm, dry, no rashes.





Course





- Vital Signs


Vital signs: 


 











Temp Pulse Resp BP Pulse Ox


 


 97.7 F      22 H  149/70 H  100 


 


 08/08/18 12:29     08/08/18 13:06  08/08/18 13:06  08/08/18 12:14














- Laboratory


Result Diagrams: 


 08/08/18 13:33





 08/08/18 13:33


Laboratory results interpreted by me: 


 











  08/08/18 08/08/18





  13:33 13:33


 


WBC  12.7 H 


 


RDW  15.0 H 


 


Seg Neutrophils %  89.0 H 


 


Lymphocytes %  6.3 L 


 


Absolute Neutrophils  11.3 H 


 


Sodium   134.0 L


 


Potassium   3.5 L


 


Est GFR (Non-Af Amer)   50 L


 


AST   188 H


 


ALT   67 H


 


Total Protein   5.6 L


 


Albumin   2.9 L














Discharge





- Discharge


Clinical Impression: 


 Fall, Fracture, ribs, Laceration of face





Condition: Stable


Disposition: HOME, SELF-CARE


Additional Instructions: 


HEAD INJURY PRECAUTIONS:


     At this point, there is no evidence that your head injury is serious.  

Observation is necessary, however.


     Take only clear liquids for the first few hours, unless told otherwise by 

the doctor.  If no pain medication was prescribed, you may take acetaminophen 

according to the directions on the bottle.  Do not take any medication that may 

alter your level of alertness (unless you've discussed it with the doctor first)

.


      Limit activity for the first 24 hours.  Bed rest is best.  During the 

first 24 hours, check to see approximately every two to three hours that the 

patient is easily arousable, responds normally, and can perform common tasks 

such as walking without difficulty.


      Contact your doctor or go to the hospital if any of the following things 

occur: Persistent vomiting, difficulty in arousing the patient, worsening or 

continued headache, or failure to improve as expected.  Head injuries can cause 

symptoms that persist for a few days or even a few weeks.








NECK INJURY (CERVICAL STRAIN):


     You have a neck strain.  This is an injury to the muscles and ligaments in 

the neck.  There is no evidence of a fracture of the neck bones.  Also, no 

injury to the spinal cord or nerve roots was detected.


     Usually, stiffness and pain INCREASE for the first 24-48 hours after the 

injury.  The pain will gradually resolve and the neck will become more mobile.  

Most patients are back at work or school within a few days.  Typically, 

complete healing takes about two or three weeks.


     The usual initial treatment is rest and cold packs.  A neck collar may be 

placed to keep the muscles of the neck at rest. Antiinflammatory and muscle 

relaxing medication are often used to reduce the spasm and irritation.


     You should call the doctor, or go to the hospital, if you develop numbness 

or weakness in any extremity, problems with your bladder or bowel, or pain 

radiating down the arms.











CONTUSION:


    Your injury has resulted in a contusion -- a crushing of the deep tissues.  

No injury to important structures was detected during the physician's exam.  

Contusions vary in the amount of pain they cause, and in the length of time 

required for healing.  Typically, the area will become bruised, and will remain 

painful to touch for two or three weeks.  However, most patients are back to 

working and playing within a few days.


     After the initial period of rest and cold-packs, your symptoms (together 

with the doctor's recommendations) will determine how rapidly you can get back 

to full activity.  Usually this means "do what feels okay, but don't do things 

that hurt."


     If re-examination was recommended, it's important to follow up as 

instructed.  Call the doctor or return any time if pain increases, if swelling 

becomes severe, if you develop numbness or weakness in an injured extremity, or 

if any other alarming symptoms occur.








USE OF TYLENOL (ACETAMINOPHEN):


     Acetaminophen may be taken for pain relief or fever control. It's much 

safer than aspirin, offering a wider range of "safe" dosages.  It is safe 

during pregnancy.  Some brand names are Tylenol, Panadol, Datril, Anacin 3, 

Tempra, and Liquiprin. Acetaminophen can be repeated every four hours.  The 

following are maximum recommended dosages:





WEIGHT         Dose             Drops                  Elixir        Chewable(

80mg)


(LBS.)                            drprs=droppers    tsp=teaspoon


>89 pounds or adults          650 mg to 900 mg





Acetaminophen can be repeated every four hours.  Maximum dose not to exceed 

4000 mg a day.





   These maximum recommended dosages are slightly higher than the dosages 

written on the product container, but these dosages are very safe and below the 

toxic dosage for acetaminophen.








NON-SUTURED LACERATION:


     Your laceration did not require suturing.  Some lacerations cannot be 

sutured because of increased infection risk, while others simply don't need 

stitches because they are shallow or very short.


     Your injury should be protected while it heals.  Usually complete healing 

takes 10 to 14 days.  Keep the dressing clean and dry, and change it every day.


     If you notice increasing pain, redness, swelling, drainage, or tender 

lumps in the armpit or groin above the injury, infection may be present.  You 

should call the doctor at once.





Rib Injuries and Fractures


     You have been diagnosed as having either bruised or broken ribs. These two 

injuries are treated in the same way.  It will usually take four to six weeks 

for these injured ribs to heal.


     Sometimes, rib belts or anesthetic injections of the chest wall help 

reduce the pain. If you are using a rib belt, you should cough or take a deep 

breath at least every hour or two to prevent lung complications.


     You should not engage in any strenuous physical activity until released by 

your physician.  The usual rule is "if it hurts, don't do it."


     Rib fractures can lead to serious lung complications including lung 

collapse, hemorrhage, and pneumonia.  You should call the physician or return 

at once if any of the following occur: 


(1) Fever or chills. 


(2) Persistent cough, coughing up blood, or shortness of breath. 


(3) Increasing pain. 


(4) Weakness, lightheadedness, or fainting.








ORAL NARCOTIC MEDICATION:


     You have been given a prescription for pain control.  This medication is a 

narcotic.  It's best taken with food, as nausea can result if taken on an empty 

stomach.


     Don't operate machinery or drive within six hours of taking this 

medication.  Do not combine this medicine with alcohol, or with any medication 

which can cause sedation (such as cold tablets or sleeping pills) unless you 

get permission from the physician.


     Narcotics tend to cause constipation.  If possible, drink plenty of fluids 

and eat a diet high in fiber and fruits.








FOLLOW-UP CARE:


If you have been referred to a physician for follow-up care, call the physician

s office for an appointment as you were instructed or within the next two days.

  If you experience worsening or a significant change in your symptoms, notify 

the physician immediately or return to the Emergency Department at any time for 

re-evaluation.


Prescriptions: 


Oxycodone HCl/Acetaminophen [Percocet 5-325 mg Tablet] 1 - 2 tab PO Q4H PRN #20 

tablet


 PRN Reason: 


Referrals: 


LEXI LAL, NP [Primary Care Provider] - Follow up as needed

## 2018-08-08 NOTE — RADIOLOGY REPORT (SQ)
EXAM DESCRIPTION:  CT HEAD WITHOUT



COMPLETED DATE/TIME:  8/8/2018 1:02 pm



REASON FOR STUDY:  bed 15 s/p fall hit head



COMPARISON:  7/15/2018, 7/16/2013 CT brain



TECHNIQUE:  Axial images acquired through the brain without intravenous contrast.  Images reviewed wi
th bone, brain and subdural windows.  Additional sagittal and coronal reconstructions were generated.
 Images stored on PACS.

All CT scanners at this facility use dose modulation, iterative reconstruction, and/or weight based d
osing when appropriate to reduce radiation dose to as low as reasonably achievable (ALARA).

[facility]: Dose Right  [facility]: CareDose    [facility]: Dose Right    [facility]: Teradose 4D    [facility]: Smart Technologies



RADIATION DOSE:  CT Rad equipment meets quality standard of care and radiation dose reduction techniq
ues were employed. CTDIvol: 53.2 mGy. DLP: 1017 mGy-cm.mGy.



LIMITATIONS:  None.



FINDINGS:  VENTRICLES: Prominent.

CEREBRUM: No masses.  No hemorrhage.  No midline shift.  Areas of low density in the white matter mos
t likely due to chronic micro-vascular ischemic change.  No evidence for acute infarction.

CEREBELLUM: No masses.  No hemorrhage.  No alteration of density.  No evidence for acute infarction.

EXTRAAXIAL SPACES: Age-related involutional change.  No fluid collections.  No masses.

ORBITS AND GLOBE: No intra- or extraconal masses.  Prior right cataract surgery

CALVARIUM: No fracture.

PARANASAL SINUSES: No fluid or mucosal thickening.

SOFT TISSUES: No mass or hematoma.

OTHER: No other significant finding.



IMPRESSION:  CHRONIC CHANGES OF ATROPHY AND MICROVASCULAR ISCHEMIA.  NO ACUTE PROCESS.

EVIDENCE OF ACUTE STROKE: NO.



TECHNICAL DOCUMENTATION:  JOB ID:  [id number]

Quality ID # 436: Final reports with documentation of one or more dose reduction techniques (e.g., Au
tomated exposure control, adjustment of the mA and/or kV according to patient size, use of iterative 
reconstruction technique)

 2011 [employer]- All Rights Reserved



Reading location - IP/workstation name: [email or web address]

## 2018-08-08 NOTE — RADIOLOGY REPORT (SQ)
EXAM DESCRIPTION:  RIBS RIGHT W/PA CHEST



COMPLETED DATE/TIME:  8/8/2018 12:52 pm



REASON FOR STUDY:  Fall with right anterior lower rib pain



COMPARISON:  AP chest 7/15/2018



TECHNIQUE:  Frontal view of the chest and additional views of the right ribs acquired.



NUMBER OF VIEWS:  PA chest, left rib detail two views



LIMITATIONS:  None.



FINDINGS:  FRONTAL CXR: No pneumothorax.  No pleural effusion.  No atelectasis or infiltrates.  Cardi
ac silhouette size normal.

RIBS: Acute minimally depressed fractures of the right lateral 3rd, 6th, and 7th ribs best shown on t
he oblique view.

OTHER: All



IMPRESSION:  Acute minimally depressed fractures of the right lateral 3rd 5th and 6th ribs.  No pneum
othorax or pleural effusion.  No acute infiltrates.



COMMENT:  SITE OF TRAUMA/COMPLAINT MARKED/STAMP COMPLETED: No



TECHNICAL DOCUMENTATION:  JOB ID:  [id number]

 2011 [employer]- All Rights Reserved



Reading location - IP/workstation name: [facility]-RR
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
## 2018-09-25 NOTE — RADIOLOGY REPORT (SQ)
EXAM DESCRIPTION:  CHEST SINGLE VIEW



COMPLETED DATE/TIME:  9/25/2018 12:10 pm



REASON FOR STUDY:  Altered mental status, dementia, no history obtain



COMPARISON:  7/15/2018



EXAM PARAMETERS:  NUMBER OF VIEWS: One view.

TECHNIQUE: Single frontal radiographic view of the chest acquired.

RADIATION DOSE: NA

LIMITATIONS: None.



FINDINGS:  LUNGS AND PLEURA: No opacities, masses or pneumothorax. No pleural effusion.

MEDIASTINUM AND HILAR STRUCTURES: No masses.  Contour normal.

HEART AND VASCULAR STRUCTURES: Heart normal in size.  Normal vasculature.

BONES: No acute findings.

HARDWARE: None in the chest.

OTHER: No other significant finding.



IMPRESSION:  NO ACUTE RADIOGRAPHIC FINDING IN THE CHEST.



TECHNICAL DOCUMENTATION:  JOB ID:  [id number]

 2011 [employer]- All Rights Reserved



Reading location - IP/workstation name: [email or web address]

## 2018-09-25 NOTE — EKG REPORT
SEVERITY:- ABNORMAL ECG -

SINUS TACHYCARDIA

IVCD, CONSIDER ATYPICAL RBBB

:

Confirmed by: Berkley Jauregui MD 25-Sep-2018 17:01:03
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
## 2018-09-26 NOTE — PHYSICIAN ADVISORY NOTE
Physician Advisor ProgressNote


.: 


Pursuant to the  plan for [facility], I have reviewed the medical record 

for this patient.  





Physician Advisor Statement: 


Willde documentation of stage of decub, 








Please consider documenting, if you agree:


1.  "Acute Kidney Injury, baseline Cr =1.0, likely due to _____" -  (due to 

sepsis?  due to dehydration?  ...  tx'd w/IVF initially)


2.  "acute hypernatremia, likely due to ______"  (t'xd w/IVF initially)


3.  "Acute metabolic encephalopathy due to _______"  (& state what findings 

were different from her baseline dementia)


4.  "moderate malnutrition"   (signif bitermporal wasting, albumin 2.5, 

advanced dementia ... - any other evidence?)











Note for reviewers:  Dx Sepsis appears supported by leukocytosis, tachycardia, 

occ'l tachypnea, MAP as low as 53, O2 sat down to 91%, JOLEEN, GCS total was only 8

/15, .... - So meets Sepsis-2 & Sepsis-3 criteria, & she was tx'd accordingly, 

& hosp course was consistent w/this dx.





Thanks!


CK